# Patient Record
Sex: MALE | Race: WHITE | NOT HISPANIC OR LATINO
[De-identification: names, ages, dates, MRNs, and addresses within clinical notes are randomized per-mention and may not be internally consistent; named-entity substitution may affect disease eponyms.]

---

## 2023-04-28 ENCOUNTER — NON-APPOINTMENT (OUTPATIENT)
Age: 28
End: 2023-04-28

## 2023-04-28 ENCOUNTER — APPOINTMENT (OUTPATIENT)
Dept: INTERNAL MEDICINE | Facility: CLINIC | Age: 28
End: 2023-04-28
Payer: COMMERCIAL

## 2023-04-28 VITALS
SYSTOLIC BLOOD PRESSURE: 120 MMHG | WEIGHT: 248 LBS | OXYGEN SATURATION: 97 % | BODY MASS INDEX: 36.32 KG/M2 | HEART RATE: 67 BPM | DIASTOLIC BLOOD PRESSURE: 90 MMHG | HEIGHT: 69.17 IN

## 2023-04-28 DIAGNOSIS — Z83.3 FAMILY HISTORY OF DIABETES MELLITUS: ICD-10-CM

## 2023-04-28 DIAGNOSIS — Z13.228 ENCOUNTER FOR SCREENING FOR OTHER METABOLIC DISORDERS: ICD-10-CM

## 2023-04-28 DIAGNOSIS — Z82.49 FAMILY HISTORY OF ISCHEMIC HEART DISEASE AND OTHER DISEASES OF THE CIRCULATORY SYSTEM: ICD-10-CM

## 2023-04-28 DIAGNOSIS — Z80.3 FAMILY HISTORY OF MALIGNANT NEOPLASM OF BREAST: ICD-10-CM

## 2023-04-28 DIAGNOSIS — Z83.49 FAMILY HISTORY OF OTHER ENDOCRINE, NUTRITIONAL AND METABOLIC DISEASES: ICD-10-CM

## 2023-04-28 PROCEDURE — 99385 PREV VISIT NEW AGE 18-39: CPT | Mod: 25

## 2023-04-28 PROCEDURE — 93000 ELECTROCARDIOGRAM COMPLETE: CPT

## 2023-04-28 NOTE — REVIEW OF SYSTEMS
[Pain] : no pain [Itching] : no itching [Hearing Loss] : no hearing loss [Sore Throat] : no sore throat [Palpitations] : no palpitations [Paroxysmal Nocturnal Dyspnea] : no paroxysmal nocturnal dyspnea [Wheezing] : no wheezing [Nausea] : no nausea [Heartburn] : no heartburn [Incontinence] : no incontinence [Frequency] : no frequency [Joint Stiffness] : no joint stiffness [Dizziness] : no dizziness [Joint Swelling] : no joint swelling [Unsteady Walk] : no ataxia [Insomnia] : no insomnia [Easy Bruising] : no easy bruising

## 2023-04-28 NOTE — HISTORY OF PRESENT ILLNESS
[FreeTextEntry1] : Establish care [de-identified] : 27 year old male with PMHx of fatty liver and YULIANA on CPAP who presents to establish care. Currently he has no acute medical complaints.

## 2023-04-28 NOTE — HEALTH RISK ASSESSMENT
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

## 2023-04-28 NOTE — PHYSICAL EXAM
[No Acute Distress] : no acute distress [Normal Sclera/Conjunctiva] : normal sclera/conjunctiva [Normal Outer Ear/Nose] : the outer ears and nose were normal in appearance [Normal Oropharynx] : the oropharynx was normal [No JVD] : no jugular venous distention [No Lymphadenopathy] : no lymphadenopathy [No Respiratory Distress] : no respiratory distress  [No Accessory Muscle Use] : no accessory muscle use [Normal Rate] : normal rate  [Regular Rhythm] : with a regular rhythm [Normal S1, S2] : normal S1 and S2 [No Edema] : there was no peripheral edema [Soft] : abdomen soft [No CVA Tenderness] : no CVA  tenderness [No Spinal Tenderness] : no spinal tenderness [No Rash] : no rash

## 2023-04-28 NOTE — PLAN
[FreeTextEntry1] : YULIANA on CPAP- refer to pulmonary\par Obesity/Screening for metabolic disorders - will obtain labs, advised diet/ls modifications

## 2023-05-02 LAB
25(OH)D3 SERPL-MCNC: 26.3 NG/ML
ALBUMIN SERPL ELPH-MCNC: 4.9 G/DL
ALP BLD-CCNC: 75 U/L
ALT SERPL-CCNC: 99 U/L
ANION GAP SERPL CALC-SCNC: 17 MMOL/L
AST SERPL-CCNC: 55 U/L
BASOPHILS # BLD AUTO: 0.06 K/UL
BASOPHILS NFR BLD AUTO: 0.7 %
BILIRUB DIRECT SERPL-MCNC: 0.2 MG/DL
BILIRUB INDIRECT SERPL-MCNC: 0.5 MG/DL
BILIRUB SERPL-MCNC: 0.7 MG/DL
BUN SERPL-MCNC: 11 MG/DL
CALCIUM SERPL-MCNC: 9.8 MG/DL
CHLORIDE SERPL-SCNC: 99 MMOL/L
CHOLEST SERPL-MCNC: 205 MG/DL
CO2 SERPL-SCNC: 22 MMOL/L
CREAT SERPL-MCNC: 0.9 MG/DL
EGFR: 120 ML/MIN/1.73M2
EOSINOPHIL # BLD AUTO: 0.3 K/UL
EOSINOPHIL NFR BLD AUTO: 3.6 %
ESTIMATED AVERAGE GLUCOSE: 120 MG/DL
GLUCOSE SERPL-MCNC: 83 MG/DL
HBA1C MFR BLD HPLC: 5.8 %
HCT VFR BLD CALC: 47.7 %
HDLC SERPL-MCNC: 31 MG/DL
HGB BLD-MCNC: 15.1 G/DL
IMM GRANULOCYTES NFR BLD AUTO: 0.2 %
LDLC SERPL CALC-MCNC: 146 MG/DL
LYMPHOCYTES # BLD AUTO: 2.88 K/UL
LYMPHOCYTES NFR BLD AUTO: 34.2 %
MAN DIFF?: NORMAL
MCHC RBC-ENTMCNC: 29 PG
MCHC RBC-ENTMCNC: 31.7 GM/DL
MCV RBC AUTO: 91.6 FL
MONOCYTES # BLD AUTO: 0.49 K/UL
MONOCYTES NFR BLD AUTO: 5.8 %
NEUTROPHILS # BLD AUTO: 4.66 K/UL
NEUTROPHILS NFR BLD AUTO: 55.5 %
NONHDLC SERPL-MCNC: 173 MG/DL
PLATELET # BLD AUTO: 361 K/UL
POTASSIUM SERPL-SCNC: 3.9 MMOL/L
PROT SERPL-MCNC: 8.3 G/DL
RBC # BLD: 5.21 M/UL
RBC # FLD: 13.3 %
SODIUM SERPL-SCNC: 139 MMOL/L
TRIGL SERPL-MCNC: 137 MG/DL
TSH SERPL-ACNC: 2.1 UIU/ML
VIT B12 SERPL-MCNC: 550 PG/ML
WBC # FLD AUTO: 8.41 K/UL

## 2023-06-01 ENCOUNTER — NON-APPOINTMENT (OUTPATIENT)
Age: 28
End: 2023-06-01

## 2023-06-01 ENCOUNTER — APPOINTMENT (OUTPATIENT)
Dept: PULMONOLOGY | Facility: CLINIC | Age: 28
End: 2023-06-01
Payer: COMMERCIAL

## 2023-06-01 VITALS — OXYGEN SATURATION: 94 % | DIASTOLIC BLOOD PRESSURE: 97 MMHG | SYSTOLIC BLOOD PRESSURE: 140 MMHG | HEART RATE: 86 BPM

## 2023-06-01 PROCEDURE — 99203 OFFICE O/P NEW LOW 30 MIN: CPT

## 2023-06-01 NOTE — ASSESSMENT
[FreeTextEntry1] : Mr. CASA PALACIOS is an 27 year old male with obstructive sleep apnea on PAP treatment.  Currently using travel CPAP device that he purchased on his own.  Recommend obtaining updated sleep study and prescribing standard domicile CPAP device if appropriate.

## 2023-06-01 NOTE — HISTORY OF PRESENT ILLNESS
[FreeTextEntry1] : CASA PALACIOS is a 27 year male, with history of obstructive sleep apnea on PAP treatment, who presents to the office for initial evaluation of sleep apnea. He states that he is feeling well overall with no respiratory complaints. Patient reports that he would like to re-establish care for sleep apnea treatment. He states that he was diagnosed with Obstructive sleep apnea 3 years ago after receiving a sleep study. Getting supplies regularly\par \par Device: [employer] AirMini\par \par

## 2023-06-01 NOTE — PROCEDURE
[FreeTextEntry1] : Unable to directly access data from travel CPAP device.  Average CPAP pressure noted at 14.

## 2023-06-01 NOTE — ADDENDUM
[FreeTextEntry1] : I, Cullen Estebanrichelle, acted solely as a scribe for Dr. Darrick Mehta M.D. on this date 06/01/2023. \par \par All medical record entries made by the Scribe were at my, Dr. Darrick Mehta M.D., direction and personally dictated by me on 06/01/2023. I have reviewed the chart and agree that the record accurately reflects my personal performance of the history, physical exam, assessment and plan. I have also personally directed, reviewed, and agreed with the chart.

## 2023-06-30 ENCOUNTER — FORM ENCOUNTER (OUTPATIENT)
Age: 28
End: 2023-06-30

## 2023-06-30 ENCOUNTER — APPOINTMENT (OUTPATIENT)
Dept: PULMONOLOGY | Facility: CLINIC | Age: 28
End: 2023-06-30
Payer: COMMERCIAL

## 2023-07-01 PROCEDURE — 95800 SLP STDY UNATTENDED: CPT | Mod: 52

## 2023-07-02 PROCEDURE — 95800 SLP STDY UNATTENDED: CPT | Mod: 52

## 2023-07-17 ENCOUNTER — APPOINTMENT (OUTPATIENT)
Dept: PULMONOLOGY | Facility: CLINIC | Age: 28
End: 2023-07-17
Payer: COMMERCIAL

## 2023-07-17 PROCEDURE — 99213 OFFICE O/P EST LOW 20 MIN: CPT | Mod: 95

## 2023-07-17 NOTE — HISTORY OF PRESENT ILLNESS
[TextBox_4] : Telehealth appointment to review results of home sleep study known YULIANA currently uses portable CPAP set at 14 cm.  Uses nasal mask.  Home sleep study for reassessment of YULIANA

## 2023-07-17 NOTE — ASSESSMENT
[FreeTextEntry1] : Results of sleep study reviewed with patient. Treatment options including weight loss oral appliance therapy and PAP therapy were reviewed with patient. After careful review of sleep study patient desire and risk factors recommend initial therapy with PAP. Will order auto titrating device.\par \par Advised telehealth appointment 1 week after receiving PAP device for initial data review.\par \par

## 2023-07-17 NOTE — PROCEDURE
[FreeTextEntry1] : Home sleep study confirms diagnosis of severe YULIANA with AHI 66 events per hour.  Moderate oxygen desaturation noted.

## 2023-07-17 NOTE — REASON FOR VISIT
[Home] : at home, [unfilled] , at the time of the visit. [Medical Office: ([facility])___] : at the medical office located in  [Patient] : the patient

## 2023-08-02 ENCOUNTER — TRANSCRIPTION ENCOUNTER (OUTPATIENT)
Age: 28
End: 2023-08-02

## 2023-08-03 ENCOUNTER — TRANSCRIPTION ENCOUNTER (OUTPATIENT)
Age: 28
End: 2023-08-03

## 2023-08-04 ENCOUNTER — RX CHANGE (OUTPATIENT)
Age: 28
End: 2023-08-04

## 2023-08-04 ENCOUNTER — APPOINTMENT (OUTPATIENT)
Dept: INTERNAL MEDICINE | Facility: CLINIC | Age: 28
End: 2023-08-04
Payer: COMMERCIAL

## 2023-08-04 VITALS
WEIGHT: 245 LBS | DIASTOLIC BLOOD PRESSURE: 90 MMHG | SYSTOLIC BLOOD PRESSURE: 118 MMHG | HEIGHT: 69.17 IN | OXYGEN SATURATION: 99 % | BODY MASS INDEX: 35.88 KG/M2 | HEART RATE: 62 BPM

## 2023-08-04 DIAGNOSIS — R79.89 OTHER SPECIFIED ABNORMAL FINDINGS OF BLOOD CHEMISTRY: ICD-10-CM

## 2023-08-04 DIAGNOSIS — E66.9 OBESITY, UNSPECIFIED: ICD-10-CM

## 2023-08-04 PROCEDURE — 99214 OFFICE O/P EST MOD 30 MIN: CPT

## 2023-08-05 ENCOUNTER — APPOINTMENT (OUTPATIENT)
Dept: ULTRASOUND IMAGING | Facility: IMAGING CENTER | Age: 28
End: 2023-08-05
Payer: COMMERCIAL

## 2023-08-05 ENCOUNTER — OUTPATIENT (OUTPATIENT)
Dept: OUTPATIENT SERVICES | Facility: HOSPITAL | Age: 28
LOS: 1 days | End: 2023-08-05
Payer: COMMERCIAL

## 2023-08-05 DIAGNOSIS — R79.89 OTHER SPECIFIED ABNORMAL FINDINGS OF BLOOD CHEMISTRY: ICD-10-CM

## 2023-08-05 PROCEDURE — 76700 US EXAM ABDOM COMPLETE: CPT

## 2023-08-05 PROCEDURE — 76700 US EXAM ABDOM COMPLETE: CPT | Mod: 26

## 2023-08-07 LAB
ALBUMIN SERPL ELPH-MCNC: 4.7 G/DL
ALP BLD-CCNC: 69 U/L
ALT SERPL-CCNC: 81 U/L
ANION GAP SERPL CALC-SCNC: 15 MMOL/L
AST SERPL-CCNC: 48 U/L
BILIRUB DIRECT SERPL-MCNC: 0.2 MG/DL
BILIRUB INDIRECT SERPL-MCNC: 0.4 MG/DL
BILIRUB SERPL-MCNC: 0.6 MG/DL
BUN SERPL-MCNC: 15 MG/DL
CALCIUM SERPL-MCNC: 9.8 MG/DL
CHLORIDE SERPL-SCNC: 103 MMOL/L
CHOLEST SERPL-MCNC: 197 MG/DL
CO2 SERPL-SCNC: 23 MMOL/L
CREAT SERPL-MCNC: 0.93 MG/DL
EGFR: 115 ML/MIN/1.73M2
ESTIMATED AVERAGE GLUCOSE: 114 MG/DL
GLUCOSE SERPL-MCNC: 95 MG/DL
HBA1C MFR BLD HPLC: 5.6 %
HBV SURFACE AB SER QL: REACTIVE
HBV SURFACE AG SER QL: NONREACTIVE
HCV AB SER QL: NONREACTIVE
HCV S/CO RATIO: 0.11 S/CO
HDLC SERPL-MCNC: 32 MG/DL
LDLC SERPL CALC-MCNC: 141 MG/DL
NONHDLC SERPL-MCNC: 165 MG/DL
POTASSIUM SERPL-SCNC: 3.8 MMOL/L
PROT SERPL-MCNC: 8.3 G/DL
SODIUM SERPL-SCNC: 140 MMOL/L
TRIGL SERPL-MCNC: 132 MG/DL

## 2023-08-08 ENCOUNTER — TRANSCRIPTION ENCOUNTER (OUTPATIENT)
Age: 28
End: 2023-08-08

## 2023-08-08 NOTE — PLAN
[FreeTextEntry1] : YULIANA on CPAP- refer to pulmonary Obesity/weight gain - c/w diet/ls modifications, will start wegovy Abnormal LFTs - viral hepatitis screen negative, likely due to NAFLD, pt has U/S abdomen pending tomorrow, will repeat labs today

## 2023-08-08 NOTE — HISTORY OF PRESENT ILLNESS
[FreeTextEntry1] : follow-up [de-identified] : 27 year old male here for a f/u visit. Currently he has no acute medical complaints. He has modified his diet and has increased his physical activity since last visit.  He denies any chest pain, shortness of breath, cough or abdominal pain.

## 2023-08-08 NOTE — PHYSICAL EXAM
[No Acute Distress] : no acute distress [Normal Sclera/Conjunctiva] : normal sclera/conjunctiva [Normal Outer Ear/Nose] : the outer ears and nose were normal in appearance [No JVD] : no jugular venous distention [No Respiratory Distress] : no respiratory distress  [No Accessory Muscle Use] : no accessory muscle use [Clear to Auscultation] : lungs were clear to auscultation bilaterally [Normal Rate] : normal rate  [Regular Rhythm] : with a regular rhythm [Normal S1, S2] : normal S1 and S2 [No Edema] : there was no peripheral edema [Soft] : abdomen soft [No CVA Tenderness] : no CVA  tenderness [No Spinal Tenderness] : no spinal tenderness [Coordination Grossly Intact] : coordination grossly intact

## 2023-08-15 ENCOUNTER — TRANSCRIPTION ENCOUNTER (OUTPATIENT)
Age: 28
End: 2023-08-15

## 2024-03-19 ENCOUNTER — APPOINTMENT (OUTPATIENT)
Dept: PULMONOLOGY | Facility: CLINIC | Age: 29
End: 2024-03-19
Payer: COMMERCIAL

## 2024-03-19 VITALS — DIASTOLIC BLOOD PRESSURE: 84 MMHG | OXYGEN SATURATION: 95 % | HEART RATE: 68 BPM | SYSTOLIC BLOOD PRESSURE: 120 MMHG

## 2024-03-19 DIAGNOSIS — G47.33 OBSTRUCTIVE SLEEP APNEA (ADULT) (PEDIATRIC): ICD-10-CM

## 2024-03-19 PROCEDURE — 99213 OFFICE O/P EST LOW 20 MIN: CPT

## 2024-03-19 RX ORDER — SEMAGLUTIDE 0.25 MG/.5ML
0.25 INJECTION, SOLUTION SUBCUTANEOUS
Qty: 1 | Refills: 3 | Status: COMPLETED | COMMUNITY
Start: 2023-08-04 | End: 2024-03-19

## 2024-03-19 NOTE — HISTORY OF PRESENT ILLNESS
[FreeTextEntry1] : Here for YULIANA followup. Reports no current respiratory symptoms or sleep symptoms. Using PAP on a nightly basis without difficulty. Reports no symptoms of daytime sleepiness. Getting supplies regularly.   Device: AirFIA Formula Ese 10   Vendor: [employer]   Setting: 10-15  Mask: Airfit [id number]  Issues: none

## 2024-03-19 NOTE — ASSESSMENT
[FreeTextEntry1] : Patient is currently on treatment with PAP. Data download confirms excellent compliance. Patient continues to use treatment and is benefiting from it.   Patient to f/u in one year, unless he encounters issues with the machine and/or has had a significant weight loss

## 2024-03-19 NOTE — PHYSICAL EXAM
[Heart Rate And Rhythm] : heart rate was normal and rhythm regular [Heart Sounds] : normal S1 and S2 [Murmurs] : no murmurs [Heart Sounds Gallop] : no gallops [Heart Sounds Pericardial Friction Rub] : no pericardial rub [] : no respiratory distress [Auscultation Breath Sounds / Voice Sounds] : lungs were clear to auscultation bilaterally

## 2024-09-23 ENCOUNTER — APPOINTMENT (OUTPATIENT)
Dept: INTERNAL MEDICINE | Facility: CLINIC | Age: 29
End: 2024-09-23

## 2024-10-29 ENCOUNTER — NON-APPOINTMENT (OUTPATIENT)
Age: 29
End: 2024-10-29

## 2024-10-29 ENCOUNTER — APPOINTMENT (OUTPATIENT)
Dept: INTERNAL MEDICINE | Facility: CLINIC | Age: 29
End: 2024-10-29
Payer: COMMERCIAL

## 2024-10-29 VITALS
HEIGHT: 69.17 IN | SYSTOLIC BLOOD PRESSURE: 110 MMHG | WEIGHT: 243 LBS | HEART RATE: 77 BPM | BODY MASS INDEX: 35.58 KG/M2 | TEMPERATURE: 98.2 F | DIASTOLIC BLOOD PRESSURE: 80 MMHG | OXYGEN SATURATION: 98 %

## 2024-10-29 DIAGNOSIS — Z13.228 ENCOUNTER FOR SCREENING FOR OTHER METABOLIC DISORDERS: ICD-10-CM

## 2024-10-29 DIAGNOSIS — E66.9 OBESITY, UNSPECIFIED: ICD-10-CM

## 2024-10-29 PROCEDURE — 99395 PREV VISIT EST AGE 18-39: CPT

## 2024-10-29 PROCEDURE — 93000 ELECTROCARDIOGRAM COMPLETE: CPT

## 2024-10-30 LAB
ALBUMIN SERPL ELPH-MCNC: 4.6 G/DL
ALP BLD-CCNC: 66 U/L
ALT SERPL-CCNC: 74 U/L
ANION GAP SERPL CALC-SCNC: 14 MMOL/L
AST SERPL-CCNC: 46 U/L
BASOPHILS # BLD AUTO: 0.09 K/UL
BASOPHILS NFR BLD AUTO: 0.8 %
BILIRUB DIRECT SERPL-MCNC: 0.1 MG/DL
BILIRUB INDIRECT SERPL-MCNC: 0.3 MG/DL
BILIRUB SERPL-MCNC: 0.4 MG/DL
BUN SERPL-MCNC: 13 MG/DL
CALCIUM SERPL-MCNC: 10 MG/DL
CHLORIDE SERPL-SCNC: 101 MMOL/L
CHOLEST SERPL-MCNC: 176 MG/DL
CO2 SERPL-SCNC: 24 MMOL/L
CREAT SERPL-MCNC: 1.09 MG/DL
EGFR: 95 ML/MIN/1.73M2
EOSINOPHIL # BLD AUTO: 0.39 K/UL
EOSINOPHIL NFR BLD AUTO: 3.6 %
ESTIMATED AVERAGE GLUCOSE: 108 MG/DL
GLUCOSE SERPL-MCNC: 75 MG/DL
HBA1C MFR BLD HPLC: 5.4 %
HCT VFR BLD CALC: 50.1 %
HDLC SERPL-MCNC: 31 MG/DL
HGB BLD-MCNC: 15.7 G/DL
IMM GRANULOCYTES NFR BLD AUTO: 0.3 %
LDLC SERPL CALC-MCNC: 123 MG/DL
LYMPHOCYTES # BLD AUTO: 3.05 K/UL
LYMPHOCYTES NFR BLD AUTO: 28.3 %
MAN DIFF?: NORMAL
MCHC RBC-ENTMCNC: 28.8 PG
MCHC RBC-ENTMCNC: 31.3 G/DL
MCV RBC AUTO: 91.8 FL
MONOCYTES # BLD AUTO: 0.54 K/UL
MONOCYTES NFR BLD AUTO: 5 %
NEUTROPHILS # BLD AUTO: 6.69 K/UL
NEUTROPHILS NFR BLD AUTO: 62 %
NONHDLC SERPL-MCNC: 146 MG/DL
PLATELET # BLD AUTO: 349 K/UL
POTASSIUM SERPL-SCNC: 4.1 MMOL/L
PROT SERPL-MCNC: 8.3 G/DL
RBC # BLD: 5.46 M/UL
RBC # FLD: 13.8 %
SODIUM SERPL-SCNC: 139 MMOL/L
TRIGL SERPL-MCNC: 127 MG/DL
TSH SERPL-ACNC: 3.64 UIU/ML
WBC # FLD AUTO: 10.79 K/UL

## 2024-11-04 ENCOUNTER — TRANSCRIPTION ENCOUNTER (OUTPATIENT)
Age: 29
End: 2024-11-04

## 2025-03-18 ENCOUNTER — APPOINTMENT (OUTPATIENT)
Dept: PULMONOLOGY | Facility: CLINIC | Age: 30
End: 2025-03-18
Payer: SUBSIDIZED

## 2025-03-18 PROCEDURE — 99213 OFFICE O/P EST LOW 20 MIN: CPT | Mod: 95
